# Patient Record
Sex: FEMALE | Race: WHITE | Employment: FULL TIME | URBAN - METROPOLITAN AREA
[De-identification: names, ages, dates, MRNs, and addresses within clinical notes are randomized per-mention and may not be internally consistent; named-entity substitution may affect disease eponyms.]

---

## 2017-03-09 ENCOUNTER — HOSPITAL ENCOUNTER (OUTPATIENT)
Age: 52
Discharge: HOME | End: 2017-03-09
Attending: FAMILY MEDICINE
Payer: COMMERCIAL

## 2017-03-09 VITALS
RESPIRATION RATE: 16 BRPM | OXYGEN SATURATION: 98 % | SYSTOLIC BLOOD PRESSURE: 134 MMHG | WEIGHT: 175 LBS | TEMPERATURE: 98 F | DIASTOLIC BLOOD PRESSURE: 91 MMHG | HEART RATE: 72 BPM

## 2017-03-09 DIAGNOSIS — M65.4 DE QUERVAIN'S TENOSYNOVITIS, RIGHT: Primary | ICD-10-CM

## 2017-03-09 PROCEDURE — 99202 OFFICE O/P NEW SF 15 MIN: CPT

## 2017-03-09 PROCEDURE — 99203 OFFICE O/P NEW LOW 30 MIN: CPT

## 2017-03-09 NOTE — ED PROVIDER NOTES
Patient Seen in: [id number] [facility]    History   Patient presents with:  Upper Extremity Injury (musculoskeletal)    Stated Complaint: RIGHT WRIST INJURY    HPI    70-year-old female presents to the clinic today with chief complaints of pain SpO2 03/09/17 1731 98 %   O2 Device 03/09/17 1731 None (Room air)       Current:/91 mmHg  Pulse 72  Temp(Src) 97.5 °F (36.4 °C) (Temporal)  Resp 16  Wt 79.379 kg  SpO2 98%        Physical Exam    GENERAL: well developed, well nourished,in no appare Prescribed:  Current Discharge Medication List    START taking these medications    Elastic Bandages & Supports (THUMB SPLINT/LEFT LARGE) Does not apply Misc  Size to fit.   Qty: 1 each Refills: 0

## 2017-03-09 NOTE — ED INITIAL ASSESSMENT (HPI)
Pt sts felt a pop and then pain to right wrist today while brushing teeth. Pt is right handed.  Denies hx of fracture to hand or wrist.

## 2017-03-10 NOTE — ED NOTES
Thumb spica applied. Sts hand/thumb position feels unnatural and uncomfortable. Splint removed. MD informed.

## 2023-11-08 ENCOUNTER — APPOINTMENT (OUTPATIENT)
Dept: CT IMAGING | Age: 58
End: 2023-11-08
Attending: NURSE PRACTITIONER
Payer: COMMERCIAL

## 2023-11-08 ENCOUNTER — HOSPITAL ENCOUNTER (OUTPATIENT)
Age: 58
Discharge: HOME | End: 2023-11-08
Payer: COMMERCIAL

## 2023-11-08 ENCOUNTER — APPOINTMENT (OUTPATIENT)
Dept: GENERAL RADIOLOGY | Age: 58
End: 2023-11-08
Attending: NURSE PRACTITIONER
Payer: COMMERCIAL

## 2023-11-08 VITALS
OXYGEN SATURATION: 93 % | TEMPERATURE: 98 F | RESPIRATION RATE: 16 BRPM | HEIGHT: 60 IN | SYSTOLIC BLOOD PRESSURE: 104 MMHG | DIASTOLIC BLOOD PRESSURE: 71 MMHG | HEART RATE: 86 BPM | BODY MASS INDEX: 33.38 KG/M2 | WEIGHT: 170 LBS

## 2023-11-08 DIAGNOSIS — R05.1 ACUTE COUGH: Primary | ICD-10-CM

## 2023-11-08 DIAGNOSIS — R93.89 ABNORMAL CHEST X-RAY: ICD-10-CM

## 2023-11-08 DIAGNOSIS — R50.9 FEVER: ICD-10-CM

## 2023-11-08 LAB
#MXD IC: 0.3 X10ˆ3/UL (ref 0.1–1)
BUN BLD-MCNC: 10 MG/DL (ref 7–18)
CHLORIDE BLD-SCNC: 97 MMOL/L (ref 98–112)
CO2 BLD-SCNC: 28 MMOL/L (ref 21–32)
CREAT BLD-MCNC: 0.8 MG/DL
EGFRCR SERPLBLD CKD-EPI 2021: 86 ML/MIN/1.73M2 (ref 60–?)
GLUCOSE BLD-MCNC: 175 MG/DL (ref 70–99)
HCT VFR BLD AUTO: 44 %
HCT VFR BLD CALC: 43 %
HGB BLD-MCNC: 14.4 G/DL
ISTAT IONIZED CALCIUM FOR CHEM 8: 1.13 MMOL/L (ref 1.12–1.32)
LYMPHOCYTES # BLD AUTO: 1.3 X10ˆ3/UL (ref 1–4)
LYMPHOCYTES NFR BLD AUTO: 24.4 %
MCH RBC QN AUTO: 30.6 PG (ref 26–34)
MCHC RBC AUTO-ENTMCNC: 32.7 G/DL (ref 31–37)
MCV RBC AUTO: 93.6 FL (ref 80–100)
MIXED CELL %: 5.9 %
NEUTROPHILS # BLD AUTO: 3.8 X10ˆ3/UL (ref 1.5–7.7)
NEUTROPHILS NFR BLD AUTO: 69.7 %
PLATELET # BLD AUTO: 174 X10ˆ3/UL (ref 150–450)
POTASSIUM BLD-SCNC: 3.9 MMOL/L (ref 3.6–5.1)
RBC # BLD AUTO: 4.7 X10ˆ6/UL
SARS-COV-2 RNA RESP QL NAA+PROBE: NOT DETECTED
SODIUM BLD-SCNC: 136 MMOL/L (ref 136–145)
WBC # BLD AUTO: 5.4 X10ˆ3/UL (ref 4–11)

## 2023-11-08 PROCEDURE — 85025 COMPLETE CBC W/AUTO DIFF WBC: CPT | Performed by: NURSE PRACTITIONER

## 2023-11-08 PROCEDURE — U0002 COVID-19 LAB TEST NON-CDC: HCPCS | Performed by: NURSE PRACTITIONER

## 2023-11-08 PROCEDURE — 80047 BASIC METABLC PNL IONIZED CA: CPT | Performed by: NURSE PRACTITIONER

## 2023-11-08 PROCEDURE — 99203 OFFICE O/P NEW LOW 30 MIN: CPT | Performed by: NURSE PRACTITIONER

## 2023-11-08 PROCEDURE — 71046 X-RAY EXAM CHEST 2 VIEWS: CPT | Performed by: NURSE PRACTITIONER

## 2023-11-08 PROCEDURE — 71250 CT THORAX DX C-: CPT | Performed by: NURSE PRACTITIONER

## 2023-11-08 RX ORDER — AZITHROMYCIN 250 MG/1
TABLET, FILM COATED ORAL
Qty: 6 TABLET | Refills: 0 | Status: SHIPPED | OUTPATIENT
Start: 2023-11-08 | End: 2023-11-13

## 2023-11-08 RX ORDER — ASPIRIN 81 MG/1
81 TABLET, CHEWABLE ORAL DAILY
COMMUNITY
Start: 2021-02-25

## 2023-11-08 RX ORDER — ALBUTEROL SULFATE 90 UG/1
2 AEROSOL, METERED RESPIRATORY (INHALATION) EVERY 4 HOURS PRN
Qty: 1 EACH | Refills: 0 | Status: SHIPPED | OUTPATIENT
Start: 2023-11-08 | End: 2023-12-08

## 2023-11-08 RX ORDER — AMOXICILLIN AND CLAVULANATE POTASSIUM 875; 125 MG/1; MG/1
1 TABLET, FILM COATED ORAL 2 TIMES DAILY
Qty: 14 TABLET | Refills: 0 | Status: SHIPPED | OUTPATIENT
Start: 2023-11-08 | End: 2023-11-15

## 2023-11-08 RX ORDER — SODIUM CHLORIDE 9 MG/ML
1000 INJECTION, SOLUTION INTRAVENOUS ONCE
Status: DISCONTINUED | OUTPATIENT
Start: 2023-11-08 | End: 2023-11-08

## 2023-11-08 RX ORDER — ATORVASTATIN CALCIUM 40 MG/1
40 TABLET, FILM COATED ORAL NIGHTLY
COMMUNITY

## 2023-11-08 NOTE — DISCHARGE INSTRUCTIONS
As we discussed this could be something called sarcoidosis. Also concern for possible underlying bacterial infection. Take Augmentin and azithromycin as prescribed. May take over-the-counter antihistamine cough suppressant. Push fluids and rest.  Use albuterol inhaler as needed for any shortness of breath wheezing or persistent cough. Follow-up with pulmonology as soon as possible for further evaluation and care.

## 2023-11-08 NOTE — ED INITIAL ASSESSMENT (HPI)
Pt with c/o congested cough, nasal congestion, HA, and subjective fever that started on Saturday. Pt has been treating with Mucinex with temporary improvement.

## (undated) NOTE — ED AVS SNAPSHOT
THE [facility] in [place] [address] [address]    Phone:  [phone]    Fax:  [place]   MRN: [id number]    Department:  THE [facility] in [place]   Date of Visit:  3/9/2017           [place] self-assessment the day after your visit. You may also receive a call from our patient liason soon after your visit. Also, some patients receive a detailed feedback survey mailed to them a week after the visit.   If you receive this, we would really apprec [facility] [phone] [address] [facility] [address] ([address]) [facility] [address] [facility].  (Cat If you have questions, you can call [phone] to talk to our [facility] Staff. Remember, [facility] is NOT to be used for urgent needs. For medical emergencies, dial 911.